# Patient Record
Sex: FEMALE | Race: OTHER | HISPANIC OR LATINO | URBAN - METROPOLITAN AREA
[De-identification: names, ages, dates, MRNs, and addresses within clinical notes are randomized per-mention and may not be internally consistent; named-entity substitution may affect disease eponyms.]

---

## 2022-12-19 ENCOUNTER — APPOINTMENT (RX ONLY)
Dept: URBAN - METROPOLITAN AREA CLINIC 41 | Facility: CLINIC | Age: 22
Setting detail: DERMATOLOGY
End: 2022-12-19

## 2022-12-19 DIAGNOSIS — Z41.9 ENCOUNTER FOR PROCEDURE FOR PURPOSES OTHER THAN REMEDYING HEALTH STATE, UNSPECIFIED: ICD-10-CM | Status: STABLE

## 2022-12-19 DIAGNOSIS — L70.0 ACNE VULGARIS: ICD-10-CM | Status: STABLE

## 2022-12-19 DIAGNOSIS — L81.0 POSTINFLAMMATORY HYPERPIGMENTATION: ICD-10-CM | Status: STABLE

## 2022-12-19 DIAGNOSIS — L20.89 OTHER ATOPIC DERMATITIS: ICD-10-CM | Status: STABLE

## 2022-12-19 PROCEDURE — ? PRESCRIPTION

## 2022-12-19 PROCEDURE — ? ADDITIONAL NOTES

## 2022-12-19 PROCEDURE — ? PRESCRIPTION MEDICATION MANAGEMENT

## 2022-12-19 PROCEDURE — ? COUNSELING

## 2022-12-19 PROCEDURE — 99214 OFFICE O/P EST MOD 30 MIN: CPT

## 2022-12-19 PROCEDURE — ? COSMETIC CONSULTATION: FILLERS

## 2022-12-19 RX ORDER — MINOCYCLINE HYDROCHLORIDE 100 MG/1
CAPSULE ORAL BID
Qty: 60 | Refills: 1 | Status: ERX | COMMUNITY
Start: 2022-12-19

## 2022-12-19 RX ORDER — DESONIDE 0.5 MG/G
CREAM TOPICAL
Qty: 60 | Refills: 1 | Status: ERX | COMMUNITY
Start: 2022-12-19

## 2022-12-19 RX ADMIN — MINOCYCLINE HYDROCHLORIDE: 100 CAPSULE ORAL at 00:00

## 2022-12-19 RX ADMIN — DESONIDE: 0.5 CREAM TOPICAL at 00:00

## 2022-12-19 ASSESSMENT — LOCATION DETAILED DESCRIPTION DERM
LOCATION DETAILED: SUPERIOR THORACIC SPINE
LOCATION DETAILED: RIGHT PROXIMAL POSTERIOR THIGH

## 2022-12-19 ASSESSMENT — LOCATION SIMPLE DESCRIPTION DERM
LOCATION SIMPLE: RIGHT POSTERIOR THIGH
LOCATION SIMPLE: UPPER BACK

## 2022-12-19 ASSESSMENT — LOCATION ZONE DERM
LOCATION ZONE: LEG
LOCATION ZONE: TRUNK

## 2022-12-19 NOTE — PROCEDURE: COUNSELING
Patient Specific Counseling (Will Not Stick From Patient To Patient): —\\nPt notes she is using a cream from [facility]. BG offers oral antibiotics to help calm acne, pt inquires about side effects. BG counsels about side effects and pt would like to move forward with pills. Patient Specific Counseling (Will Not Stick From Patient To Patient): —\\nPt notes she is using a cream from [facility]. BG offers oral antibiotics to help calm acne, pt inquires about side effects. BG counsels about side effects and pt would like to move forward with pills.

## 2022-12-19 NOTE — PROCEDURE: COUNSELING
Patient Specific Counseling (Will Not Stick From Patient To Patient): —\\nPt prescribed desonide and triamcinolone in the past by NG. BG offers refill of desonide for dryness and irritation on face, pt agrees. BG notes dryness is common in winter and pt must continue to moisturize.

## 2022-12-19 NOTE — HPI: ACNE (PATIENT REPORTED)
Where Is Your Acne Located?: Back, thighs
Additional Comments (Use Complete Sentences): Pt notes she has been using a soap with benzoyl peroxide but would like to get rid of scars.

## 2022-12-19 NOTE — PROCEDURE: PRESCRIPTION MEDICATION MANAGEMENT
no
Detail Level: Zone
Render In Strict Bullet Format?: No
Initiate Treatment: Minocycline 100mg BID x 2 months
Initiate Treatment: Desonide ointment up to BID x 2 weeks at a time

## 2022-12-19 NOTE — HPI: DRY SKIN
Is This A New Presentation Or A Follow-Up?: Dry Skin
Additional History: Est pt, new to . Pt has been using aquaphor. Pt has hx of eczema and has used triamcinolone. Pt was receiving doxy in 2020 by NG for HS.

## 2022-12-19 NOTE — PROCEDURE: COUNSELING
Patient Specific Counseling (Will Not Stick From Patient To Patient): —\\nPt asks about prevention of darkness, BG notes pt must moisturize areas adequately and resolution takes time. BG notes darkness in between thighs is the same thing as what she has on the back of the thighs and buttocks and does not look active.

## 2022-12-19 NOTE — PROCEDURE: COUNSELING
no abrasions, no jaundice, no lesions, no pruritis, and no rashes. High Dose Vitamin A Pregnancy And Lactation Text: High dose vitamin A therapy is contraindicated during pregnancy and breast feeding.